# Patient Record
Sex: FEMALE | Race: WHITE | Employment: UNEMPLOYED | URBAN - METROPOLITAN AREA
[De-identification: names, ages, dates, MRNs, and addresses within clinical notes are randomized per-mention and may not be internally consistent; named-entity substitution may affect disease eponyms.]

---

## 2018-11-17 ENCOUNTER — OFFICE VISIT (OUTPATIENT)
Dept: URGENT CARE | Facility: MEDICAL CENTER | Age: 28
End: 2018-11-17

## 2018-11-17 VITALS
DIASTOLIC BLOOD PRESSURE: 84 MMHG | HEART RATE: 76 BPM | OXYGEN SATURATION: 98 % | BODY MASS INDEX: 41.44 KG/M2 | HEIGHT: 69 IN | WEIGHT: 279.8 LBS | SYSTOLIC BLOOD PRESSURE: 138 MMHG | RESPIRATION RATE: 18 BRPM | TEMPERATURE: 98.3 F

## 2018-11-17 DIAGNOSIS — J02.9 SORE THROAT: Primary | ICD-10-CM

## 2018-11-17 LAB — S PYO AG THROAT QL: NEGATIVE

## 2018-11-17 PROCEDURE — 87430 STREP A AG IA: CPT | Performed by: PHYSICIAN ASSISTANT

## 2018-11-17 PROCEDURE — G0382 LEV 3 HOSP TYPE B ED VISIT: HCPCS | Performed by: PHYSICIAN ASSISTANT

## 2018-11-17 RX ORDER — DULOXETIN HYDROCHLORIDE 60 MG/1
60 CAPSULE, DELAYED RELEASE ORAL DAILY
Refills: 3 | COMMUNITY
Start: 2018-10-15

## 2018-11-17 RX ORDER — LEVONORGESTREL AND ETHINYL ESTRADIOL 0.1-0.02MG
1 KIT ORAL DAILY
Refills: 3 | COMMUNITY
Start: 2018-11-13

## 2018-11-17 NOTE — PROGRESS NOTES
[facility]        NAME: Brissa Laguerre is a 33 y o  female  : 1990    MRN: [id number]  DATE: 2018  TIME: 10:14 AM    Assessment and Plan   Sore throat [J02 9]  1  Sore throat  POCT rapid strepA         Patient Instructions     1  Motrin as needed for throat pain  2  Push fluids  3  Follow up with PCP in 3-5 days if symptoms persist  4  Consider testing for EBV    Chief Complaint     Chief Complaint   Patient presents with    Sore Throat         History of Present Illness       The patient is a 50-year-old female presents with a four-day history of acute onset sore throat  She denies any fever, headache or nausea since the onset of her symptoms  Patient has noticed increased fatigue  Review of Systems   Review of Systems   Constitutional: Negative  HENT: Positive for sore throat  Respiratory: Negative  Gastrointestinal: Negative  Current Medications       Current Outpatient Prescriptions:     DULoxetine (CYMBALTA) 60 mg delayed release capsule, Take 60 mg by mouth daily, Disp: , Rfl: 3    LARISSIA 0 1-20 MG-MCG per tablet, Take 1 tablet by mouth daily, Disp: , Rfl: 3    Current Allergies     Allergies as of 2018    (No Known Allergies)            The following portions of the patient's history were reviewed and updated as appropriate: allergies, current medications, past family history, past medical history, past social history, past surgical history and problem list      No past medical history on file  No past surgical history on file  No family history on file  Medications have been verified  Objective   /84   Pulse 76   Temp 98 3 °F (36 8 °C) (Temporal)   Resp 18   Ht 5' 9" (1 753 m)   Wt 127 kg (279 lb 12 8 oz)   SpO2 98%   BMI 41 32 kg/m²        Physical Exam     Physical Exam   Constitutional: She appears well-developed and well-nourished  No distress  HENT:   Head: Normocephalic and atraumatic     Right Ear: Tympanic membrane and ear canal normal    Left Ear: Tympanic membrane and ear canal normal    Nose: Nose normal    Mouth/Throat: Uvula is midline  Oropharyngeal exudate and posterior oropharyngeal erythema present  Cardiovascular: Normal rate, regular rhythm and normal heart sounds  No murmur heard  Pulmonary/Chest: Effort normal and breath sounds normal    Abdominal: Soft  Bowel sounds are normal  There is no hepatosplenomegaly  There is no tenderness  Lymphadenopathy:        Head (right side): Tonsillar adenopathy present  Head (left side): Tonsillar adenopathy present  She has cervical adenopathy  Right cervical: Superficial cervical and posterior cervical adenopathy present  Left cervical: Superficial cervical and posterior cervical adenopathy present

## 2018-11-17 NOTE — PATIENT INSTRUCTIONS
1  Motrin as needed for throat pain  2  Push fluids  3  Follow up with PCP in 3-5 days if symptoms persist  4   Consider testing for EBV

## 2019-08-04 ENCOUNTER — OFFICE VISIT (OUTPATIENT)
Dept: URGENT CARE | Facility: MEDICAL CENTER | Age: 29
End: 2019-08-04

## 2019-08-04 VITALS
DIASTOLIC BLOOD PRESSURE: 80 MMHG | BODY MASS INDEX: 41.32 KG/M2 | OXYGEN SATURATION: 98 % | RESPIRATION RATE: 18 BRPM | TEMPERATURE: 97 F | SYSTOLIC BLOOD PRESSURE: 160 MMHG | WEIGHT: 279 LBS | HEART RATE: 94 BPM | HEIGHT: 69 IN

## 2019-08-04 DIAGNOSIS — R10.11 RIGHT UPPER QUADRANT PAIN: Primary | ICD-10-CM

## 2019-08-04 PROCEDURE — G0382 LEV 3 HOSP TYPE B ED VISIT: HCPCS | Performed by: PHYSICIAN ASSISTANT

## 2019-08-04 RX ORDER — ONDANSETRON 4 MG/1
4 TABLET, FILM COATED ORAL EVERY 8 HOURS PRN
Qty: 20 TABLET | Refills: 0 | Status: SHIPPED | OUTPATIENT
Start: 2019-08-04

## 2019-08-04 NOTE — PATIENT INSTRUCTIONS
Right upper quadrant pain  zofran 4 mg sub lingual as needed for nausea  Follow up with PCP in 3-5 days  Proceed to  ER if symptoms worsen  Abdominal Pain   WHAT YOU NEED TO KNOW:   Abdominal pain can be dull, achy, or sharp  You may have pain in one area of your abdomen, or in your entire abdomen  Your pain may be caused by a condition such as constipation, food sensitivity or poisoning, infection, or a blockage  Abdominal pain can also be from a hernia, appendicitis, or an ulcer  Liver, gallbladder, or kidney conditions can also cause abdominal pain  The cause of your abdominal pain may be unknown  DISCHARGE INSTRUCTIONS:   Return to the emergency department if:   · You have new chest pain or shortness of breath  · You have pulsing pain in your upper abdomen or lower back that suddenly becomes constant  · Your pain is in the right lower abdominal area and worsens with movement  · You have a fever over 100 4°F (38°C) or shaking chills  · You are vomiting and cannot keep food or liquids down  · Your pain does not improve or gets worse over the next 8 to 12 hours  · You see blood in your vomit or bowel movements, or they look black and tarry  · Your skin or the whites of your eyes turn yellow  · You are a woman and have a large amount of vaginal bleeding that is not your monthly period  Contact your healthcare provider if:   · You have pain in your lower back  · You are a man and have pain in your testicles  · You have pain when you urinate  · You have questions or concerns about your condition or care  Follow up with your healthcare provider within 24 hours or as directed:  Write down your questions so you remember to ask them during your visits  Medicines:   · Medicines  may be given to calm your stomach and prevent vomiting or to decrease pain  Ask how to take pain medicine safely  · Take your medicine as directed    Contact your healthcare provider if you think your medicine is not helping or if you have side effects  Tell him of her if you are allergic to any medicine  Keep a list of the medicines, vitamins, and herbs you take  Include the amounts, and when and why you take them  Bring the list or the pill bottles to follow-up visits  Carry your medicine list with you in case of an emergency  © 2017 [employer] Information is for End User's use only and may not be sold, redistributed or otherwise used for commercial purposes  All illustrations and images included in CareNotes® are the copyrighted property of [employer] , [employer]  or [employer]  The above information is an  only  It is not intended as medical advice for individual conditions or treatments  Talk to your doctor, nurse or pharmacist before following any medical regimen to see if it is safe and effective for you

## 2019-08-04 NOTE — PROGRESS NOTES
[facility]        NAME: Iliana Lassiter is a 30 y o  female  : 1990    MRN: [id number]  DATE: 2019  TIME: 6:01 PM    Assessment and Plan   Right upper quadrant pain [R10 11]  1  Right upper quadrant pain           Patient Instructions     Right upper quadrant pain  zofran 4 mg sub lingual as needed for nausea  Follow up with PCP in 3-5 days  Proceed to  ER if symptoms worsen  Chief Complaint     Chief Complaint   Patient presents with    Abdominal Pain     vomiting after eating a large amount of food this afternoon  took tums first, took zantac and vomited and took another zantac before coming here           History of Present Illness       29 y/o female presents c/o abdominal pain after eating heavy meal today  Patient describes pain as crampy and states that she feels much better after vomiting  ( 2 episodes of vomiting) Patient denies fever, chills, chest pain, SOB, palpitations      Review of Systems   Review of Systems   Constitutional: Negative  HENT: Negative  Eyes: Negative  Respiratory: Negative  Negative for cough, chest tightness, shortness of breath, wheezing and stridor  Cardiovascular: Negative  Negative for chest pain, palpitations and leg swelling  Gastrointestinal: Positive for abdominal pain, nausea and vomiting  Negative for abdominal distention, anal bleeding, blood in stool, constipation, diarrhea and rectal pain           Current Medications       Current Outpatient Medications:     DULoxetine (CYMBALTA) 60 mg delayed release capsule, Take 60 mg by mouth daily, Disp: , Rfl: 3    LARISSIA 0 1-20 MG-MCG per tablet, Take 1 tablet by mouth daily, Disp: , Rfl: 3    Current Allergies     Allergies as of 2019    (No Known Allergies)            The following portions of the patient's history were reviewed and updated as appropriate: allergies, current medications, past family history, past medical history, past social history, past surgical history and problem list      History reviewed  No pertinent past medical history  History reviewed  No pertinent surgical history  History reviewed  No pertinent family history  Medications have been verified  Objective   /80   Pulse 94   Temp (!) 97 °F (36 1 °C)   Resp 18   Ht 5' 9" (1 753 m)   Wt 127 kg (279 lb)   LMP 07/29/2019   SpO2 98%   BMI 41 20 kg/m²        Physical Exam     Physical Exam   Constitutional: She appears well-developed and well-nourished  HENT:   Head: Normocephalic and atraumatic  Right Ear: External ear normal    Left Ear: External ear normal    Nose: Nose normal    Neck: Normal range of motion  Neck supple  Cardiovascular: Normal rate, regular rhythm, normal heart sounds and intact distal pulses  Pulmonary/Chest: Effort normal and breath sounds normal  No respiratory distress  She has no wheezes  She has no rales  She exhibits no tenderness  Abdominal: Soft  Bowel sounds are normal  She exhibits no distension and no mass  There is no tenderness  There is positive Gonzalez's sign  There is no rigidity, no rebound, no guarding, no CVA tenderness and no tenderness at McBurney's point  Lymphadenopathy:     She has no cervical adenopathy

## 2025-02-12 ENCOUNTER — OFFICE VISIT (OUTPATIENT)
Dept: URGENT CARE | Facility: MEDICAL CENTER | Age: 35
End: 2025-02-12
Payer: COMMERCIAL

## 2025-02-12 VITALS
HEART RATE: 83 BPM | SYSTOLIC BLOOD PRESSURE: 158 MMHG | BODY MASS INDEX: 44.41 KG/M2 | RESPIRATION RATE: 18 BRPM | DIASTOLIC BLOOD PRESSURE: 77 MMHG | HEIGHT: 68 IN | OXYGEN SATURATION: 99 % | WEIGHT: 293 LBS | TEMPERATURE: 97.7 F

## 2025-02-12 DIAGNOSIS — R52 BODY ACHES: ICD-10-CM

## 2025-02-12 DIAGNOSIS — B97.89 ACUTE VIRAL SINUSITIS: Primary | ICD-10-CM

## 2025-02-12 DIAGNOSIS — J01.90 ACUTE VIRAL SINUSITIS: Primary | ICD-10-CM

## 2025-02-12 PROCEDURE — 99213 OFFICE O/P EST LOW 20 MIN: CPT

## 2025-02-12 PROCEDURE — 87636 SARSCOV2 & INF A&B AMP PRB: CPT

## 2025-02-12 NOTE — PATIENT INSTRUCTIONS
Hold antibiotics until COVID/Flu results come back, if no improvement in 2-3 days, start antibiotics as prescribed.     Reassurance provided that Gina Falk lungs are clear on examination today.    Take antibiotics as prescribed. Complete entire course of antibiotics even if feeling better.     Eat yogurt with live and active cultures and/or take a probiotic at least 3 hours before or after antibiotic dose. Monitor stool for diarrhea and/or blood. If this occurs, contact primary care doctor ASAP.     Take over the counter:  Mucinex during the day  Flonase  Non-drowsy oral antihistamine such as Zyrtec    Recommend the following options: room humidifier, vicks vapo rub, hot/steamy shower (practice proper safety precautions when handing hot liquids/steam)  Offer fluids frequently to help with hydration, as staying hydrated helps loosen up thick mucous.   May drink warm water with honey. May use lemon.    Warm compresses over sinuses  Over the counter saline nasal spray    Tylenol/Ibuprofen for pain/fever.    Encourage coughing into the elbow instead of the hand.   Washing hands frequently with warm water and soap may help stop spread of infection.    If symptoms do not improve, please follow with PCP in 3-5 days for further evaluation.    Proceed to ER if you become acutely short of breath, you have any difficulty breathing, you develop chest pain or any worsening symptoms.    If tests are performed, our office will contact you with results only if changes need to made to the care plan discussed with you at the visit. You can review your full results on [facility].

## 2025-02-12 NOTE — LETTER
February 12, 2025     Patient: Gina Falk   YOB: 1990   Date of Visit: 2/12/2025       To Whom it May Concern:    Gina Falk was seen in my clinic on 2/12/2025.     She may return to work on 2/18/2025 or sooner as long as she is fever free for 24 hours without the use of fever reducing agents and symptoms are resolving.  .    If you have any questions or concerns, please don't hesitate to call.         Sincerely,          BUBBA Lopez        CC: No Recipients

## 2025-02-12 NOTE — PROGRESS NOTES
[facility]        NAME: Gina Falk is a 35 y.o. female  : 1990    MRN: [id number]  DATE: 2025  TIME: 4:44 PM    Assessment and Plan   Acute viral sinusitis [J01.90, B97.89]  1. Acute viral sinusitis  Covid/Flu-Office Collect      2. Body aches          Will rule out COVID/Flu    PCR for COVID/Flu collected in clinic, will call patient if results are positive and [facility]     Work note provided    Patient Instructions   Hold antibiotics until COVID/Flu results come back, if no improvement in 2-3 days, start antibiotics as prescribed.     Reassurance provided that Gina Falk lungs are clear on examination today.    Take antibiotics as prescribed. Complete entire course of antibiotics even if feeling better.     Eat yogurt with live and active cultures and/or take a probiotic at least 3 hours before or after antibiotic dose. Monitor stool for diarrhea and/or blood. If this occurs, contact primary care doctor ASAP.     Take over the counter:  Mucinex during the day  Flonase  Non-drowsy oral antihistamine such as Zyrtec    Recommend the following options: room humidifier, vicks vapo rub, hot/steamy shower (practice proper safety precautions when handing hot liquids/steam)  Offer fluids frequently to help with hydration, as staying hydrated helps loosen up thick mucous.   May drink warm water with honey. May use lemon.    Warm compresses over sinuses  Over the counter saline nasal spray    Tylenol/Ibuprofen for pain/fever.    Encourage coughing into the elbow instead of the hand.   Washing hands frequently with warm water and soap may help stop spread of infection.    If symptoms do not improve, please follow with PCP in 3-5 days for further evaluation.    Proceed to ER if you become acutely short of breath, you have any difficulty breathing, you develop chest pain or any worsening symptoms.    If tests are performed, our office will contact  you with results only if changes need to made to the care plan discussed with you at the visit. You can review your full results on [facility].    Chief Complaint     Chief Complaint   Patient presents with    Cold Like Symptoms     Started 2 days ago with sneezing, headache, no appetite, fatigue, pain behind eyes, feverish.  Has been taking Nyquil with no relief.     History of Present Illness       URI   This is a new problem. The current episode started in the past 7 days (Started Friday). The problem has been unchanged. Associated symptoms include headaches and sinus pain. Pertinent negatives include no chest pain, congestion, coughing, diarrhea, ear pain, nausea, rash, rhinorrhea, sore throat, vomiting or wheezing. Treatments tried: NyQuil. The treatment provided no relief.       Review of Systems   Review of Systems   Constitutional:  Positive for activity change, appetite change, chills, diaphoresis, fatigue and fever.   HENT:  Positive for sinus pressure and sinus pain. Negative for congestion, ear discharge, ear pain, postnasal drip, rhinorrhea and sore throat.    Respiratory: Negative.  Negative for cough, chest tightness, shortness of breath and wheezing.    Cardiovascular: Negative.  Negative for chest pain and palpitations.   Gastrointestinal:  Negative for diarrhea, nausea and vomiting.   Musculoskeletal:  Positive for myalgias.   Skin:  Negative for color change, rash and wound.   Neurological:  Positive for headaches.     Current Medications       Current Outpatient Medications:     DULoxetine (CYMBALTA) 60 mg delayed release capsule, Take 60 mg by mouth daily (Patient not taking: Reported on 2/12/2025), Disp: , Rfl: 3    LARISSIA 0.1-20 MG-MCG per tablet, Take 1 tablet by mouth daily (Patient not taking: Reported on 2/12/2025), Disp: , Rfl: 3    ondansetron (ZOFRAN) 4 mg tablet, Take 1 tablet (4 mg total) by mouth every 8 (eight) hours as needed for nausea or vomiting (Patient not taking:  "Reported on 2/12/2025), Disp: 20 tablet, Rfl: 0    Current Allergies     Allergies as of 02/12/2025    (No Known Allergies)            The following portions of the patient's history were reviewed and updated as appropriate: allergies, current medications, past family history, past medical history, past social history, past surgical history and problem list.     History reviewed. No pertinent past medical history.    Past Surgical History:   Procedure Laterality Date    GALLBLADDER SURGERY  2020       No family history on file.      Medications have been verified.        Objective   /77   Pulse 83   Temp 97.7 °F (36.5 °C)   Resp 18   Ht 5' 8\" (1.727 m)   Wt 134 kg (295 lb)   SpO2 99%   BMI 44.85 kg/m²        Physical Exam     Physical Exam  Vitals and nursing note reviewed.   Constitutional:       General: She is not in acute distress.     Appearance: Normal appearance. She is not ill-appearing, toxic-appearing or diaphoretic.   HENT:      Head: Normocephalic.      Right Ear: Tympanic membrane, ear canal and external ear normal. There is no impacted cerumen.      Left Ear: Tympanic membrane, ear canal and external ear normal. There is no impacted cerumen.      Nose: Nose normal. No congestion or rhinorrhea.      Mouth/Throat:      Mouth: Mucous membranes are moist.      Pharynx: No posterior oropharyngeal erythema.   Cardiovascular:      Rate and Rhythm: Normal rate and regular rhythm.      Pulses: Normal pulses.      Heart sounds: Normal heart sounds. No murmur heard.  Pulmonary:      Effort: Pulmonary effort is normal. No respiratory distress.      Breath sounds: Normal breath sounds. No stridor. No wheezing, rhonchi or rales.   Chest:      Chest wall: No tenderness.   Musculoskeletal:         General: Normal range of motion.   Lymphadenopathy:      Cervical: No cervical adenopathy.   Skin:     General: Skin is warm.   Neurological:      Mental Status: She is alert.                   " wheezing, rhonchi or rales.   Chest:      Chest wall: No tenderness.   Musculoskeletal:         General: Normal range of motion.   Lymphadenopathy:      Cervical: No cervical adenopathy.   Skin:     General: Skin is warm.   Neurological:      Mental Status: She is alert.

## 2025-02-13 LAB
FLUAV RNA RESP QL NAA+PROBE: NEGATIVE
FLUBV RNA RESP QL NAA+PROBE: NEGATIVE
SARS-COV-2 RNA RESP QL NAA+PROBE: NEGATIVE